# Patient Record
Sex: FEMALE | Race: WHITE | NOT HISPANIC OR LATINO | Employment: UNEMPLOYED | URBAN - METROPOLITAN AREA
[De-identification: names, ages, dates, MRNs, and addresses within clinical notes are randomized per-mention and may not be internally consistent; named-entity substitution may affect disease eponyms.]

---
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
## 2025-04-21 ENCOUNTER — OFFICE VISIT (OUTPATIENT)
Age: 20
End: 2025-04-21
Payer: COMMERCIAL

## 2025-04-21 VITALS
OXYGEN SATURATION: 98 % | DIASTOLIC BLOOD PRESSURE: 76 MMHG | HEIGHT: 61 IN | WEIGHT: 137.5 LBS | HEART RATE: 83 BPM | BODY MASS INDEX: 25.96 KG/M2 | SYSTOLIC BLOOD PRESSURE: 112 MMHG

## 2025-04-21 DIAGNOSIS — R31.9 URINARY TRACT INFECTION WITH HEMATURIA, SITE UNSPECIFIED: ICD-10-CM

## 2025-04-21 DIAGNOSIS — N39.0 URINARY TRACT INFECTION WITH HEMATURIA, SITE UNSPECIFIED: ICD-10-CM

## 2025-04-21 DIAGNOSIS — R82.90 URINE ABNORMALITY: Primary | ICD-10-CM

## 2025-04-21 LAB
BILIRUB BLD-MCNC: NEGATIVE MG/DL
CLARITY, POC: ABNORMAL
COLOR UR: ABNORMAL
EXPIRATION DATE: ABNORMAL
GLUCOSE UR STRIP-MCNC: NEGATIVE MG/DL
KETONES UR QL: NEGATIVE
LEUKOCYTE EST, POC: NEGATIVE
Lab: ABNORMAL
NITRITE UR-MCNC: POSITIVE MG/ML
PH UR: 6 [PH] (ref 5–8)
PROT UR STRIP-MCNC: NEGATIVE MG/DL
RBC # UR STRIP: ABNORMAL /UL
SP GR UR: 1.01 (ref 1–1.03)
UROBILINOGEN UR QL: NORMAL

## 2025-04-21 PROCEDURE — 87077 CULTURE AEROBIC IDENTIFY: CPT | Performed by: NURSE PRACTITIONER

## 2025-04-21 PROCEDURE — 87186 SC STD MICRODIL/AGAR DIL: CPT | Performed by: NURSE PRACTITIONER

## 2025-04-21 PROCEDURE — 87088 URINE BACTERIA CULTURE: CPT | Performed by: NURSE PRACTITIONER

## 2025-04-21 PROCEDURE — 99213 OFFICE O/P EST LOW 20 MIN: CPT | Performed by: NURSE PRACTITIONER

## 2025-04-21 PROCEDURE — 87086 URINE CULTURE/COLONY COUNT: CPT | Performed by: NURSE PRACTITIONER

## 2025-04-21 PROCEDURE — 81003 URINALYSIS AUTO W/O SCOPE: CPT | Performed by: NURSE PRACTITIONER

## 2025-04-21 RX ORDER — NITROFURANTOIN 25; 75 MG/1; MG/1
100 CAPSULE ORAL 2 TIMES DAILY
Qty: 10 CAPSULE | Refills: 0 | Status: SHIPPED | OUTPATIENT
Start: 2025-04-21 | End: 2025-04-26

## 2025-04-21 RX ORDER — DOXYCYCLINE HYCLATE 20 MG
1 TABLET ORAL EVERY 12 HOURS SCHEDULED
COMMUNITY
Start: 2025-03-10

## 2025-04-21 NOTE — PROGRESS NOTES
"Chief Complaint  Menstrual Problem    Subjective        Ambreen Almeida presents to [facility]  History of Present Illness    History of Present Illness  The patient presents for evaluation of hematuria.    Cramping and an increased amount of blood in the urine, appearing as separate drops of a darker hue, are reported. The onset of these symptoms was last night, accompanied by transient back pressure. Current discomfort is described as pain, whereas earlier it was more akin to cramps. The possibility of indigestion or nausea is mentioned. Fluid intake is notably low. A history of minor urinary tract infections is noted, but none have been as severe as the current experience.    Results  Labs   - Urine test: Presence of blood     Results for orders placed or performed in visit on 04/21/25   POC Urinalysis Dipstick, Automated    Collection Time: 04/21/25 10:35 AM    Specimen: Urine   Result Value Ref Range    Color Other (A) Yellow, Straw, Dark Yellow, Latoya    Clarity, UA Cloudy (A) Clear    Specific Gravity  1.015 1.005 - 1.030    pH, Urine 6.0 5.0 - 8.0    Leukocytes Negative Negative    Nitrite, UA Positive (A) Negative    Protein, POC Negative Negative mg/dL    Glucose, UA Negative Negative mg/dL    Ketones, UA Negative Negative    Urobilinogen, UA Normal Normal, 0.2 E.U./dL    Bilirubin Negative Negative    Blood, UA 3+ (A) Negative    Lot Number [id number]     Expiration Date 2025-06-29        Objective   Vital Signs:  /76 (BP Location: Right arm, Patient Position: Sitting, Cuff Size: Adult)   Pulse 83   Ht 154.9 cm (60.98\")   Wt 62.4 kg (137 lb 8 oz)   SpO2 98%   BMI 25.99 kg/m²   Estimated body mass index is 25.99 kg/m² as calculated from the following:    Height as of this encounter: 154.9 cm (60.98\").    Weight as of this encounter: 62.4 kg (137 lb 8 oz).    Physical Exam  Vitals reviewed.   Constitutional:       Appearance: Normal appearance.   HENT:     "  Nose: Nose normal.      Mouth/Throat:      Mouth: Mucous membranes are moist.      Pharynx: Oropharynx is clear.   Eyes:      Conjunctiva/sclera: Conjunctivae normal.   Cardiovascular:      Rate and Rhythm: Normal rate and regular rhythm.      Heart sounds: Normal heart sounds.   Pulmonary:      Effort: Pulmonary effort is normal.      Breath sounds: Normal breath sounds.   Musculoskeletal:         General: Normal range of motion.      Cervical back: Normal range of motion.   Skin:     General: Skin is warm.   Neurological:      Mental Status: She is alert and oriented to person, place, and time.   Psychiatric:         Mood and Affect: Mood normal.         Behavior: Behavior normal.         Thought Content: Thought content normal.        Result Review :                  Assessment and Plan   Diagnoses and all orders for this visit:    1. Urine abnormality (Primary)  -     POC Urinalysis Dipstick, Automated    2. Urinary tract infection with hematuria, site unspecified  -     nitrofurantoin, macrocrystal-monohydrate, (Macrobid) 100 MG capsule; Take 1 capsule by mouth 2 (Two) Times a Day for 5 days.  Dispense: 10 capsule; Refill: 0  -     Urine Culture - Urine, Urine, Clean Catch; Future  -     Urine Culture - Urine, Urine, Clean Catch      Assessment & Plan  1. Urinary Tract Infection.  - Reports cramping and noticing blood in the urine, which is darker in color. No back pain currently, but has experienced pressure in the lower abdomen and back over the past couple of weeks.  - Physical examination reveals no tenderness upon palpation of the back or upper abdomen. Mild discomfort noted in the lower abdomen.  - Urine culture ordered to ensure appropriate treatment. No fever, elevated heart rate, or abnormal blood pressure observed.  - Macrobid prescribed and sent to [facility]. Advised to increase water intake, and may take Tylenol or ibuprofen for pain management. Over-the-counter Azo can be used to  alleviate bladder spasms. If culture results indicate a different bacteria, an alternative antibiotic will be prescribed, and Macrobid will be discontinued.       The following portions of the patient's history were reviewed and updated as appropriate: allergies, current medications, past family history, past medical history, past social history, past surgical history, and problem list.       Follow Up   No follow-ups on file.  Patient was given instructions and counseling regarding her condition or for health maintenance advice. Please see specific information pulled into the AVS if appropriate.         Patient or patient representative verbalized consent for the use of Ambient Listening during the visit with  NOMI Brantley for chart documentation. 4/21/2025  11:33 EDT

## 2025-04-22 ENCOUNTER — TELEPHONE (OUTPATIENT)
Age: 20
End: 2025-04-22
Payer: COMMERCIAL

## 2025-04-22 NOTE — TELEPHONE ENCOUNTER
Caller: Ambreen Almeida    Relationship: Self    Best call back number:       [phone] (Mobile)     What is the best time to reach you:     ANY TIME    Who are you requesting to speak with (clinical staff, provider,  specific staff member):     YONG ORTIZ    What was the call regarding:     PATIENT STATED SHE SAW YONG YESTERDAY, 4/21, AND SHE HAS A NEW SYMPTOM THAT SHE WOULD LIKE TO DISCUSS

## 2025-04-22 NOTE — TELEPHONE ENCOUNTER
Called patient     She stated that her urine has change to the color to green and still having the same symptoms

## 2025-04-23 DIAGNOSIS — N39.0 URINARY TRACT INFECTION WITH HEMATURIA, SITE UNSPECIFIED: Primary | ICD-10-CM

## 2025-04-23 DIAGNOSIS — R31.9 URINARY TRACT INFECTION WITH HEMATURIA, SITE UNSPECIFIED: Primary | ICD-10-CM

## 2025-04-23 LAB — BACTERIA SPEC AEROBE CULT: ABNORMAL

## 2025-04-23 RX ORDER — SULFAMETHOXAZOLE AND TRIMETHOPRIM 800; 160 MG/1; MG/1
1 TABLET ORAL 2 TIMES DAILY
Qty: 10 TABLET | Refills: 0 | Status: SHIPPED | OUTPATIENT
Start: 2025-04-23 | End: 2025-04-28

## 2025-04-29 ENCOUNTER — OFFICE VISIT (OUTPATIENT)
Age: 20
End: 2025-04-29
Payer: COMMERCIAL

## 2025-04-29 VITALS
OXYGEN SATURATION: 97 % | DIASTOLIC BLOOD PRESSURE: 72 MMHG | HEART RATE: 74 BPM | SYSTOLIC BLOOD PRESSURE: 114 MMHG | BODY MASS INDEX: 25.51 KG/M2 | HEIGHT: 61 IN | WEIGHT: 135.1 LBS

## 2025-04-29 DIAGNOSIS — R31.9 URINARY TRACT INFECTION WITH HEMATURIA, SITE UNSPECIFIED: ICD-10-CM

## 2025-04-29 DIAGNOSIS — R30.0 DYSURIA: Primary | ICD-10-CM

## 2025-04-29 DIAGNOSIS — N39.0 URINARY TRACT INFECTION WITH HEMATURIA, SITE UNSPECIFIED: ICD-10-CM

## 2025-04-29 LAB
BILIRUB BLD-MCNC: NEGATIVE MG/DL
CLARITY, POC: ABNORMAL
COLOR UR: ABNORMAL
EXPIRATION DATE: ABNORMAL
GLUCOSE UR STRIP-MCNC: NEGATIVE MG/DL
KETONES UR QL: NEGATIVE
LEUKOCYTE EST, POC: ABNORMAL
Lab: ABNORMAL
NITRITE UR-MCNC: NEGATIVE MG/ML
PH UR: 6 [PH] (ref 5–8)
PROT UR STRIP-MCNC: ABNORMAL MG/DL
RBC # UR STRIP: ABNORMAL /UL
SP GR UR: 1.02 (ref 1–1.03)
UROBILINOGEN UR QL: NORMAL

## 2025-04-29 RX ORDER — CEFDINIR 300 MG/1
300 CAPSULE ORAL 2 TIMES DAILY
Qty: 14 CAPSULE | Refills: 0 | Status: SHIPPED | OUTPATIENT
Start: 2025-04-29 | End: 2025-05-06

## 2025-04-29 NOTE — PROGRESS NOTES
"Chief Complaint  Dysuria    Subjective        Ambreen Almeida presents to [facility]  History of Present Illness    History of Present Illness  The patient presents for evaluation of hematuria.    Blood in the urine is reported, attributed to a urinary tract infection. A course of Bactrim was completed yesterday morning. Mild back pain was experienced during the treatment without cramping. Bleeding ceased while on Bactrim but resumed after discontinuation of the medication. Compliance with the medication regimen is reported. An allergy to penicillin is noted, causing rashes, and Augmentin has not been taken before. There is no history of receiving Rocephin injections. No bowel movements during urination are reported, including the previous week. Intermittent lower abdominal cramping and pain are described as uncomfortable but manageable. A back massager was used last night to alleviate discomfort. Pain is described as soreness rather than sharp. There is no personal history of kidney stones, but a family history of the condition is noted.    FAMILY HISTORY  She has a family history of kidney stones.    Results  Labs   - Urinalysis: Blood in urine. Bacteria present in urine.     Objective   Vital Signs:  /72 (BP Location: Right arm, Patient Position: Sitting, Cuff Size: Adult)   Pulse 74   Ht 154.9 cm (60.98\")   Wt 61.3 kg (135 lb 1.6 oz)   SpO2 97%   BMI 25.54 kg/m²   Estimated body mass index is 25.54 kg/m² as calculated from the following:    Height as of this encounter: 154.9 cm (60.98\").    Weight as of this encounter: 61.3 kg (135 lb 1.6 oz).    Physical Exam  Vitals reviewed.   Constitutional:       Appearance: Normal appearance.   HENT:      Nose: Nose normal.      Mouth/Throat:      Mouth: Mucous membranes are moist.      Pharynx: Oropharynx is clear.   Eyes:      Conjunctiva/sclera: Conjunctivae normal.   Cardiovascular:      Rate and Rhythm: Normal rate and " regular rhythm.      Heart sounds: Normal heart sounds.   Pulmonary:      Effort: Pulmonary effort is normal.      Breath sounds: Normal breath sounds.   Musculoskeletal:         General: Normal range of motion.      Cervical back: Normal range of motion.   Skin:     General: Skin is warm.   Neurological:      Mental Status: She is alert and oriented to person, place, and time.   Psychiatric:         Mood and Affect: Mood normal.         Behavior: Behavior normal.         Thought Content: Thought content normal.        Result Review :                  Assessment and Plan   Diagnoses and all orders for this visit:    1. Dysuria (Primary)  -     POC Urinalysis Dipstick, Automated    2. Urinary tract infection with hematuria, site unspecified  -     cefTRIAXone (ROCEPHIN) 1 g in lidocaine (XYLOCAINE) 1 % IM only syringe  -     cefdinir (OMNICEF) 300 MG capsule; Take 1 capsule by mouth 2 (Two) Times a Day for 7 days.  Dispense: 14 capsule; Refill: 0        Assessment & Plan  1. Hematuria.  - The presence of blood in the urine raises concerns for a potential kidney stone, although this could also be attributed to a urinary tract infection (UTI).  - The patient's UTI is resistant to many antibiotics, making treatment challenging. She completed a course of Bactrim yesterday, which initially improved her symptoms, but the bleeding returned after stopping the medication.  - Given her allergy to penicillins, which causes rashes, a Rocephin injection will be administered today, followed by a prescription for Omnicef (cefdinir) to start tomorrow. There is a low likelihood of a severe reaction to cephalosporins, but she will be monitored for 15 minutes post-injection to ensure safety.  - If there is no improvement in her condition by Friday, further evaluation will be necessary.    PROCEDURE  Procedure: Rocephin injection, right hip    All questions were answered and agreement to proceed was given after the following  Pre-Procedure details were reviewed:  - Risks and Benefits: Discussed the need for Rocephin injection due to antibiotic resistance, potential for allergic reaction, and benefits of treating the infection effectively.  - Alternative Options: Discussed alternative antibiotics such as Macrobid, Cipro, and Augmentin, but these were contraindicated due to intermediate coverage or penicillin allergy.  - Side effects: Potential for allergic reaction, monitored for 15 minutes post-injection.  - Consent: Patient agreed to proceed with the injection after discussing the above details.    Intra-Procedure:  - Time-Out: Confirmed patient identity, procedure, and site.  - Site Preparation: Cleaned and disinfected the right hip injection site.  - Medication: Administered 1 g Rocephin intramuscularly.  - Dressing: Applied a small adhesive bandage to the injection site.    Post-Procedure:  - Tolerance Level: Patient tolerated the procedure well.  - Home Care Instructions: Advised to start cefdinir (Omnicef) tomorrow, monitor for any signs of allergic reaction, and contact the office if symptoms do not improve by Friday.       The following portions of the patient's history were reviewed and updated as appropriate: allergies, current medications, past family history, past medical history, past social history, past surgical history, and problem list.       Follow Up   No follow-ups on file.  Patient was given instructions and counseling regarding her condition or for health maintenance advice. Please see specific information pulled into the AVS if appropriate.         Patient or patient representative verbalized consent for the use of Ambient Listening during the visit with  NOMI Brantley for chart documentation. 4/29/2025  15:14 EDT